# Patient Record
Sex: MALE | Race: WHITE | NOT HISPANIC OR LATINO
[De-identification: names, ages, dates, MRNs, and addresses within clinical notes are randomized per-mention and may not be internally consistent; named-entity substitution may affect disease eponyms.]

---

## 2022-05-10 PROBLEM — Z00.00 ENCOUNTER FOR PREVENTIVE HEALTH EXAMINATION: Status: ACTIVE | Noted: 2022-05-10

## 2022-05-12 ENCOUNTER — APPOINTMENT (OUTPATIENT)
Dept: ORTHOPEDIC SURGERY | Facility: CLINIC | Age: 47
End: 2022-05-12
Payer: OTHER MISCELLANEOUS

## 2022-05-12 VITALS — HEIGHT: 73 IN | WEIGHT: 210 LBS | BODY MASS INDEX: 27.83 KG/M2

## 2022-05-12 DIAGNOSIS — K57.90 DIVERTICULOSIS OF INTESTINE, PART UNSPECIFIED, W/OUT PERFORATION OR ABSCESS W/OUT BLEEDING: ICD-10-CM

## 2022-05-12 DIAGNOSIS — M77.11 LATERAL EPICONDYLITIS, RIGHT ELBOW: ICD-10-CM

## 2022-05-12 DIAGNOSIS — G43.909 MIGRAINE, UNSPECIFIED, NOT INTRACTABLE, W/OUT STATUS MIGRAINOSUS: ICD-10-CM

## 2022-05-12 DIAGNOSIS — Z78.9 OTHER SPECIFIED HEALTH STATUS: ICD-10-CM

## 2022-05-12 DIAGNOSIS — S83.241A OTHER TEAR OF MEDIAL MENISCUS, CURRENT INJURY, RIGHT KNEE, INITIAL ENCOUNTER: ICD-10-CM

## 2022-05-12 DIAGNOSIS — K22.70 BARRETT'S ESOPHAGUS W/OUT DYSPLASIA: ICD-10-CM

## 2022-05-12 PROCEDURE — 99215 OFFICE O/P EST HI 40 MIN: CPT | Mod: 1L

## 2022-05-12 PROCEDURE — 99455 WORK RELATED DISABILITY EXAM: CPT

## 2022-05-12 PROCEDURE — 99072 ADDL SUPL MATRL&STAF TM PHE: CPT

## 2022-05-14 PROBLEM — G43.909 MIGRAINES: Status: RESOLVED | Noted: 2022-05-12 | Resolved: 2022-05-14

## 2022-05-14 PROBLEM — K22.70 BARRETT'S ESOPHAGUS WITHOUT DYSPLASIA: Status: RESOLVED | Noted: 2022-05-12 | Resolved: 2022-05-14

## 2022-05-14 PROBLEM — Z78.9 NON-SMOKER: Status: ACTIVE | Noted: 2022-05-12

## 2022-05-14 PROBLEM — K57.90 DIVERTICULOSIS: Status: RESOLVED | Noted: 2022-05-12 | Resolved: 2022-05-14

## 2022-05-14 PROBLEM — S83.241A ACUTE MEDIAL MENISCUS TEAR OF RIGHT KNEE, INITIAL ENCOUNTER: Status: ACTIVE | Noted: 2022-05-12

## 2022-05-14 PROBLEM — M77.11 LATERAL EPICONDYLITIS OF RIGHT ELBOW: Status: ACTIVE | Noted: 2022-05-12

## 2022-05-14 NOTE — WORK
[Has the patient reached Maximum Medical Improvement? If yes, indicate date___] : Yes, on [unfilled] [Is there permanent partial impairment?] : Yes [FreeTextEntry6] : Right knee and elbow [Right] : right [FreeTextEntry7] : Elbow [FreeTextEntry8] : Full [de-identified] : Epicondylitis and ulnar neuritis [FreeTextEntry5] : 10 [de-identified] : Knee [de-identified] : Limited - see above [de-identified] : Prior SLU 20% [de-identified] : P [de-identified] : 20 [de-identified] : Not in addition to prior schedule loss of use [___ lbs] : Frequently: [unfilled] lbs [] : Constantly [N/A] : N/A [Medium Work] : Medium work [Has the patient had an injury/illness since the date of injury which impacts residual functional capacity?] : No [Would the patient benefit from vocational rehabilitation? If Yes, explain below.] :  No
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
## 2023-10-02 ENCOUNTER — APPOINTMENT (OUTPATIENT)
Dept: ORTHOPEDIC SURGERY | Facility: CLINIC | Age: 48
End: 2023-10-02
Payer: COMMERCIAL

## 2023-10-02 VITALS — HEIGHT: 73 IN | WEIGHT: 200 LBS | BODY MASS INDEX: 26.51 KG/M2

## 2023-10-02 DIAGNOSIS — G57.82 OTHER SPECIFIED MONONEUROPATHIES OF LEFT LOWER LIMB: ICD-10-CM

## 2023-10-02 DIAGNOSIS — Z78.9 OTHER SPECIFIED HEALTH STATUS: ICD-10-CM

## 2023-10-02 PROCEDURE — 73630 X-RAY EXAM OF FOOT: CPT | Mod: LT

## 2023-10-02 PROCEDURE — 99213 OFFICE O/P EST LOW 20 MIN: CPT

## 2023-10-02 RX ORDER — LANSOPRAZOLE 30 MG/1
TABLET, ORALLY DISINTEGRATING ORAL
Refills: 0 | Status: ACTIVE | COMMUNITY

## 2023-10-02 RX ORDER — MELOXICAM 15 MG/1
15 TABLET ORAL DAILY
Qty: 30 | Refills: 1 | Status: ACTIVE | COMMUNITY
Start: 2023-10-02 | End: 1900-01-01

## 2023-10-09 ENCOUNTER — APPOINTMENT (OUTPATIENT)
Dept: ORTHOPEDIC SURGERY | Facility: CLINIC | Age: 48
End: 2023-10-09
Payer: COMMERCIAL

## 2023-10-09 VITALS — BODY MASS INDEX: 26.51 KG/M2 | HEIGHT: 73 IN | WEIGHT: 200 LBS

## 2023-10-09 PROCEDURE — 99213 OFFICE O/P EST LOW 20 MIN: CPT

## 2023-10-16 ENCOUNTER — APPOINTMENT (OUTPATIENT)
Dept: ORTHOPEDIC SURGERY | Facility: CLINIC | Age: 48
End: 2023-10-16
Payer: COMMERCIAL

## 2023-10-16 VITALS — HEIGHT: 73 IN | BODY MASS INDEX: 26.51 KG/M2 | WEIGHT: 200 LBS

## 2023-10-16 DIAGNOSIS — S86.811A STRAIN OF OTHER MUSCLE(S) AND TENDON(S) AT LOWER LEG LEVEL, RIGHT LEG, INITIAL ENCOUNTER: ICD-10-CM

## 2023-10-16 PROCEDURE — 99213 OFFICE O/P EST LOW 20 MIN: CPT

## 2023-10-27 ENCOUNTER — APPOINTMENT (OUTPATIENT)
Dept: ORTHOPEDIC SURGERY | Facility: CLINIC | Age: 48
End: 2023-10-27
Payer: COMMERCIAL

## 2023-10-27 VITALS — WEIGHT: 200 LBS | BODY MASS INDEX: 26.51 KG/M2 | HEIGHT: 73 IN

## 2023-10-27 PROCEDURE — 20605 DRAIN/INJ JOINT/BURSA W/O US: CPT | Mod: RT

## 2023-10-27 PROCEDURE — 73070 X-RAY EXAM OF ELBOW: CPT | Mod: RT

## 2023-10-27 PROCEDURE — 99213 OFFICE O/P EST LOW 20 MIN: CPT | Mod: 25

## 2023-10-27 PROCEDURE — 73610 X-RAY EXAM OF ANKLE: CPT | Mod: RT

## 2024-09-16 ENCOUNTER — APPOINTMENT (OUTPATIENT)
Dept: ORTHOPEDIC SURGERY | Facility: CLINIC | Age: 49
End: 2024-09-16

## 2024-09-19 ENCOUNTER — RX ONLY (RX ONLY)
Age: 49
End: 2024-09-19

## 2024-09-19 ENCOUNTER — OFFICE (OUTPATIENT)
Dept: URBAN - METROPOLITAN AREA CLINIC 100 | Facility: CLINIC | Age: 49
Setting detail: OPHTHALMOLOGY
End: 2024-09-19
Payer: COMMERCIAL

## 2024-09-19 DIAGNOSIS — H43.393: ICD-10-CM

## 2024-09-19 DIAGNOSIS — H52.7: ICD-10-CM

## 2024-09-19 DIAGNOSIS — H16.223: ICD-10-CM

## 2024-09-19 PROCEDURE — 92004 COMPRE OPH EXAM NEW PT 1/>: CPT | Performed by: OPHTHALMOLOGY

## 2024-09-19 PROCEDURE — 92015 DETERMINE REFRACTIVE STATE: CPT | Performed by: OPHTHALMOLOGY

## 2024-09-19 ASSESSMENT — CONFRONTATIONAL VISUAL FIELD TEST (CVF)
OD_FINDINGS: FULL
OS_FINDINGS: FULL

## 2025-05-19 ENCOUNTER — TRANSCRIPTION ENCOUNTER (OUTPATIENT)
Age: 50
End: 2025-05-19

## 2025-05-19 ENCOUNTER — APPOINTMENT (OUTPATIENT)
Dept: ORTHOPEDIC SURGERY | Facility: CLINIC | Age: 50
End: 2025-05-19
Payer: COMMERCIAL

## 2025-05-19 VITALS — BODY MASS INDEX: 28.49 KG/M2 | WEIGHT: 215 LBS | HEIGHT: 73 IN

## 2025-05-19 DIAGNOSIS — M70.21 OLECRANON BURSITIS, RIGHT ELBOW: ICD-10-CM

## 2025-05-19 DIAGNOSIS — Z00.00 ENCOUNTER FOR GENERAL ADULT MEDICAL EXAMINATION W/OUT ABNORMAL FINDINGS: ICD-10-CM

## 2025-05-19 DIAGNOSIS — L03.113 CELLULITIS OF RIGHT UPPER LIMB: ICD-10-CM

## 2025-05-19 PROCEDURE — 99213 OFFICE O/P EST LOW 20 MIN: CPT

## 2025-05-19 PROCEDURE — 73070 X-RAY EXAM OF ELBOW: CPT | Mod: RT

## 2025-05-19 RX ORDER — AMITRIPTYLINE HYDROCHLORIDE 75 MG/1
TABLET, FILM COATED ORAL
Refills: 0 | Status: ACTIVE | COMMUNITY

## 2025-05-20 ENCOUNTER — APPOINTMENT (OUTPATIENT)
Dept: ORTHOPEDIC SURGERY | Facility: CLINIC | Age: 50
End: 2025-05-20